# Patient Record
Sex: MALE | Race: WHITE | URBAN - METROPOLITAN AREA
[De-identification: names, ages, dates, MRNs, and addresses within clinical notes are randomized per-mention and may not be internally consistent; named-entity substitution may affect disease eponyms.]

---

## 2017-04-20 ENCOUNTER — OFFICE VISIT (OUTPATIENT)
Dept: DERMATOLOGY | Facility: AMBULATORY SURGERY CENTER | Age: 72
End: 2017-04-20

## 2017-04-20 VITALS
HEIGHT: 69 IN | DIASTOLIC BLOOD PRESSURE: 80 MMHG | HEART RATE: 64 BPM | SYSTOLIC BLOOD PRESSURE: 120 MMHG | TEMPERATURE: 97.9 F | OXYGEN SATURATION: 96 % | BODY MASS INDEX: 30.66 KG/M2 | WEIGHT: 207 LBS

## 2017-04-20 DIAGNOSIS — D18.01 CHERRY ANGIOMA: ICD-10-CM

## 2017-04-20 DIAGNOSIS — Z87.2 HISTORY OF ACTINIC KERATOSES: ICD-10-CM

## 2017-04-20 DIAGNOSIS — L90.5 SCAR CONDITION AND FIBROSIS OF SKIN: ICD-10-CM

## 2017-04-20 DIAGNOSIS — L82.1 SEBORRHEIC KERATOSES: ICD-10-CM

## 2017-04-20 DIAGNOSIS — Z85.828 HISTORY OF NONMELANOMA SKIN CANCER: ICD-10-CM

## 2017-04-20 DIAGNOSIS — D22.9 MULTIPLE BENIGN NEVI: Primary | ICD-10-CM

## 2017-04-20 RX ORDER — LANSOPRAZOLE 30 MG/1
CAPSULE, DELAYED RELEASE ORAL
COMMUNITY
Start: 2017-04-18

## 2017-04-20 RX ORDER — BENZONATATE 200 MG/1
CAPSULE ORAL
COMMUNITY
Start: 2017-04-09

## 2017-04-20 NOTE — PROGRESS NOTES
Name: Milagros Dupree       Age: 81 y.o. Date: 4/20/2017    Chief Complaint:   Chief Complaint   Patient presents with    Skin Exam     6 months       Subjective:    HPI  Mr. Milagros Dupree is a 81 y.o. male who presents for a full skin exam.  The patient's last skin exam was 6 months ago. At that visit I diagnosed a pigmented BCC on the right shoulder which was narrowly excised. The patient does not have current complaints related to his skin. Mr. Milagros Dupree is feeling well and in his usual state of health today. The patient's pertinent skin history includes : BCC of the right anterior shoulder, AK    ROS: Constitutional: Fatigue  Dermatological : Negative for skin lesion changes. He is not aware of any concerns on the right shoulder. CV: intermittent chest pain when taking Allegra    Social History     Social History    Marital status:      Spouse name: N/A    Number of children: N/A    Years of education: N/A     Occupational History    Not on file. Social History Main Topics    Smoking status: Never Smoker    Smokeless tobacco: Not on file    Alcohol use Yes    Drug use: No    Sexual activity: Not on file     Other Topics Concern    Not on file     Social History Narrative       No family history on file. Past Medical History:   Diagnosis Date    Skin cancer     Skin disorder     Sun-damaged skin     Sunburn, blistering        Past Surgical History:   Procedure Laterality Date    HX ORTHOPAEDIC      right knee       Current Outpatient Prescriptions   Medication Sig Dispense Refill    lansoprazole (PREVACID) 30 mg capsule       amLODIPine-benazepril (LOTREL) 5-10 mg per capsule Take 1 capsule by mouth daily.  benzonatate (TESSALON) 200 mg capsule       multivitamin (ONE A DAY) tablet Take 1 Tab by mouth daily.  omeprazole (PRILOSEC) 40 mg capsule Take 40 mg by mouth daily.          No Known Allergies      Objective:    Visit Vitals    /80 (BP 1 Location: Left arm, BP Patient Position: Sitting)    Pulse 64    Temp 97.9 °F (36.6 °C) (Oral)    Ht 5' 9\" (1.753 m)    Wt 93.9 kg (207 lb)    SpO2 96%    BMI 30.57 kg/m2       Jose Lea is a 81 y.o. male who appears well and in no distress. He is awake, alert, and oriented. There is no preauricular, submandibular, or cervical lymphadenopathy. A skin examination was performed including his scalp, face (including eyelids), ears, neck, chest, back, abdomen, upper extremities (including digits/nails), lower extremities, breasts, buttocks; genital skin was not examined. He is tanned skin on sun exposed areas. There are scattered waxy macules and keratotic papules consistent with seborrheic keratoses. He is scattered cherry angiomas. There are pink and brown intradermal as well as junctional nevi without concerning features. There is a well-healed scar on the mid back without concerning features for lesion recurrence. On the right anterior shoulder is also a pink scar without evidence of lesion recurrence, specifically no evidence of basal cell carcinoma. No new skin cancers or actinic keratosis are identified on today's exam.        Assessment/Plan:  1. Normal nevi. The diagnosis of normal nevi was reviewed. I discussed sun protection, sunscreen use, the warning signs of skin cancer, the need for self-skin examinations, and the need for regular practitioner exams every 6 months. The patient should follow up sooner as needed if new, changing, or symptomatic skin lesions arise. 2. Seborrheic keratoses. The diagnosis was reviewed and the patient was reassured that no treatment is needed for these benign lesions. 3. Cherry angiomas. The diagnosis was reviewed and the patient was reassured that no treatment is needed for these benign lesions. 4. Personal history of skin cancer.   I discussed sun protection, sunscreen use, the warning signs of skin cancer, the need for self-skin examinations, and the need for regular practitioner exams every 6 months. The patient should follow up sooner as needed if new, changing, or symptomatic skin lesions arise. Will continue to monitor right shoulder. Encouraged him to follow up with PCP if any further CP occurs.

## 2017-10-26 ENCOUNTER — OFFICE VISIT (OUTPATIENT)
Dept: DERMATOLOGY | Facility: AMBULATORY SURGERY CENTER | Age: 72
End: 2017-10-26

## 2017-10-26 VITALS
HEART RATE: 63 BPM | WEIGHT: 211 LBS | BODY MASS INDEX: 31.25 KG/M2 | OXYGEN SATURATION: 98 % | HEIGHT: 69 IN | TEMPERATURE: 97.7 F | RESPIRATION RATE: 18 BRPM | SYSTOLIC BLOOD PRESSURE: 140 MMHG | DIASTOLIC BLOOD PRESSURE: 86 MMHG

## 2017-10-26 DIAGNOSIS — L57.0 ACTINIC KERATOSIS: Primary | ICD-10-CM

## 2017-10-26 DIAGNOSIS — L81.4 LENTIGINES: ICD-10-CM

## 2017-10-26 DIAGNOSIS — L82.1 SEBORRHEIC KERATOSES: ICD-10-CM

## 2017-10-26 DIAGNOSIS — L90.5 SCAR CONDITION AND FIBROSIS OF SKIN: ICD-10-CM

## 2017-10-26 DIAGNOSIS — Z85.828 HISTORY OF NONMELANOMA SKIN CANCER: ICD-10-CM

## 2017-10-26 DIAGNOSIS — Z08 FOLLOW-UP SURVEILLANCE OF SKIN CANCER, ENCOUNTER FOR: ICD-10-CM

## 2017-10-26 DIAGNOSIS — D18.01 CHERRY ANGIOMA: ICD-10-CM

## 2017-10-26 DIAGNOSIS — D22.9 MULTIPLE BENIGN NEVI: ICD-10-CM

## 2017-10-26 DIAGNOSIS — Z85.828 FOLLOW-UP SURVEILLANCE OF SKIN CANCER, ENCOUNTER FOR: ICD-10-CM

## 2017-10-26 NOTE — PROGRESS NOTES
Chief Complaint   Patient presents with    Skin Exam     1. Have you been to the ER, urgent care clinic since your last visit? Hospitalized since your last visit? No    2. Have you seen or consulted any other health care providers outside of the [address] [place] [address] since your last visit? Include any pap smears or colon screening. Yes, PCP/ Dr. Candy Biswas 6 month check up.

## 2017-10-26 NOTE — MR AVS SNAPSHOT
Visit Information Date & Time Provider Department Dept. Phone Encounter #  
 10/26/2017  2:00 PM ANURAG Martinez [phone] Your Appointments 10/26/2017  2:00 PM  
Office Visit with ANURAG Martinez [phone] [facility]) Appt Note: est pt: 1 yr skin exam  
 [facility] [address]  
[facility] [place] [address] [address] [facility] [address] Upcoming Health Maintenance Date Due Hepatitis C Screening 1945 DTaP/Tdap/Td series (1 - Tdap) 3/24/1968 FOBT Q 1 YEAR AGE 50-75 3/24/1997 ZOSTER VACCINE AGE 60> 1/24/2007 GLAUCOMA SCREENING Q2Y 3/24/2012 Pneumococcal 65+ Low/Medium Risk (1 of 2 - PCV13) 3/24/2012 MEDICARE YEARLY EXAM 3/24/2012 Allergies as of 10/26/2017  Review Complete On: 10/26/2017 By: Allison Diaz No Known Allergies Current Immunizations  Never Reviewed No immunizations on file. Not reviewed this visit Vitals BP Pulse Temp Resp Height(growth percentile) Weight(growth percentile) 140/86 (BP 1 Location: Right arm, BP Patient Position: Sitting) 63 97.7 °F (36.5 °C) (Oral) 18 5' 9\" (1.753 m) 211 lb (95.7 kg) SpO2 BMI Smoking Status 98% 31.16 kg/m2 Never Smoker BMI and BSA Data Body Mass Index Body Surface Area  
 31.16 kg/m 2 2.16 m 2 Preferred Pharmacy Pharmacy Name Phone [facility] Keri Mendiolaly (E), [address] [facility] [phone] Your Updated Medication List  
  
   
This list is accurate as of: 10/26/17  1:50 PM.  Always use your most recent med list.  
  
  
  
  
 benzonatate 200 mg capsule Commonly known as:  TESSALON  
  
 lansoprazole 30 mg capsule Commonly known as:  PREVACID  
  
 LOTREL 5-10 mg per capsule Generic drug:  amLODIPine-benazepril Take 1 capsule by mouth daily. multivitamin tablet Commonly known as:  ONE A DAY Take 1 Tab by mouth daily. omeprazole 40 mg capsule Commonly known as:  PRILOSEC Take 40 mg by mouth daily. Patient Instructions Self Skin Exam and Sunscreens Early detection and treatment is essential in the treatment of all forms of skin cancer. If caught early, all forms of skin cancer are curable. In addition to your regular visits, you should perform a monthly skin examination. Over time, you become familiar with what is normally found on your skin and can identify new or suspicious spots. One of the screening tools you can use to assess your skin is to follow the ABCDEs: 
 
A= Asymmetry (One half is unlike the other half) B= Border (An irregular, scalloped or poorly defined edge) C= Color (Is varied from one area to another, has shades of tan, brown/ black,       white, red or blue) D= Diameter (Spots larger than 6mm or a pencil eraser) E= Evolving (New spots or one that is changing in size, shape, or color) A follow- up interval will be customized based on your history of skin cancer or level of skin damage and risk factors. In any case, if you notice a suspicious or new spot, an appointment should be arranged between regular visits. Everyone should use sunscreen and sun-safe practices, which is especially important for those with a personal or family history of skin cancer. Suggestions for this include: 1. Use daily moisturizers containing SPF 30 or higher. 2. Wear long sleeve clothing with UPF ratings and a broad-brimmed hat. 3. Apply sunscreen with SPF 30 or higher to all sun exposed areas if you are going to be in the sun. A broad spectrum UVA/ UVB sunscreen is best.  Dont forget to REAPPLY every two hours or more often if swimming or sweating! 4. Avoid outside activities during peak sun hours, especially in the summer (10am- 2pm). 5. DO NOT use tanning beds. Using sunscreen and sun-safe practices can help reduce the likelihood of developing skin cancer or additional skin cancers in those previously diagnosed. Introducing [facility] & HEALTH SERVICES! [facility] introduces Efficiency Exchange patient portal. Now you can access parts of your medical record, email your doctor's office, and request medication refills online. 1. In your internet browser, go to [email or web address] 2. Click on the First Time User? Click Here link in the Sign In box. You will see the New Member Sign Up page. 3. Enter your Efficiency Exchange Access Code exactly as it appears below. You will not need to use this code after youve completed the sign-up process. If you do not sign up before the expiration date, you must request a new code. · Efficiency Exchange Access Code: [email or web address] Expires: 1/24/2018  1:39 PM 
 
4. Enter the last four digits of your Social Security Number (xxxx) and Date of Birth (mm/dd/yyyy) as indicated and click Submit. You will be taken to the next sign-up page. 5. Create a Efficiency Exchange ID. This will be your Efficiency Exchange login ID and cannot be changed, so think of one that is secure and easy to remember. 6. Create a Efficiency Exchange password. You can change your password at any time. 7. Enter your Password Reset Question and Answer. This can be used at a later time if you forget your password. 8. Enter your e-mail address. You will receive e-mail notification when new information is available in [address] 9. Click Sign Up. You can now view and download portions of your medical record. 10. Click the Download Summary menu link to download a portable copy of your medical information. If you have questions, please visit the Frequently Asked Questions section of the Efficiency Exchange website. Remember, Efficiency Exchange is NOT to be used for urgent needs. For medical emergencies, dial 911. Now available from your iPhone and Android! Please provide this summary of care documentation to your next provider. Your primary care clinician is listed as Quinn Adhikari. If you have any questions after today's visit, please call [phone].

## 2017-10-26 NOTE — PROGRESS NOTES
Name: Kiara Monroe       Age: 81 y.o. Date: 10/26/2017    Chief Complaint:   Chief Complaint   Patient presents with    Skin Exam       Subjective:    HPI  Mr. Kiara Monroe is a 81 y.o. male who presents for a full skin exam.  The patient's last skin exam was 6 months ago and the patient does have current complaints related to his skin. He reports improvement in the scaly lesions on the scalp. The patient's pertinent skin history includes : AK, BCC right anterior shoulder    ROS: Constitutional: Negative. Dermatological : positive for - skin lesion changes      Social History     Social History    Marital status:      Spouse name: N/A    Number of children: N/A    Years of education: N/A     Occupational History    Not on file. Social History Main Topics    Smoking status: Never Smoker    Smokeless tobacco: Not on file    Alcohol use Yes    Drug use: No    Sexual activity: Not on file     Other Topics Concern    Not on file     Social History Narrative       No family history on file. Past Medical History:   Diagnosis Date    Skin cancer     Skin disorder     Sun-damaged skin     Sunburn, blistering        Past Surgical History:   Procedure Laterality Date    HX ORTHOPAEDIC      right knee       Current Outpatient Prescriptions   Medication Sig Dispense Refill    lansoprazole (PREVACID) 30 mg capsule       amLODIPine-benazepril (LOTREL) 5-10 mg per capsule Take 1 capsule by mouth daily.  benzonatate (TESSALON) 200 mg capsule       multivitamin (ONE A DAY) tablet Take 1 Tab by mouth daily.  omeprazole (PRILOSEC) 40 mg capsule Take 40 mg by mouth daily.          No Known Allergies      Objective:    Visit Vitals    /86 (BP 1 Location: Right arm, BP Patient Position: Sitting)    Pulse 63    Temp 97.7 °F (36.5 °C) (Oral)    Resp 18    Ht 5' 9\" (1.753 m)    Wt 95.7 kg (211 lb)    SpO2 98%    BMI 31.16 kg/m2       Kiara Monroe is a 81 y.o. male who appears well and in no distress. He is awake, alert, and oriented. There is no preauricular, submandibular, or cervical lymphadenopathy. A skin examination was performed including his scalp, face (including eyelids), ears, neck, chest, back, abdomen, upper extremities (including digits/nails), lower extremities (mid thighs to ankles), breasts. He has a thin scaled AK on the vertex scalp. There are scattered stuck on waxy macules and keratotic papules consistent with SKs. There are cherry angiomas. He has medium brown junctional and intradermal nevi - no concerning features. I did image on the mid back to the left of midline, 3 dots of pigment. No concern for recurrence on the right shoulder. He has lentigines as well. Assessment/Plan:  1. Normal nevi. The diagnosis of normal nevi was reviewed. I discussed sun protection, sunscreen use, the warning signs of skin cancer, the need for self-skin examinations, and the need for regular practitioner exams every 1 year. The patient should follow up sooner as needed if new, changing, or symptomatic skin lesions arise. 2.Seborrheic keratoses. The diagnosis was reviewed and the patient was reassured that no treatment is needed for these benign lesions. 3.Cherry angiomas. The diagnosis was reviewed and the patient was reassured that no treatment is needed for these benign lesions. 4.Personal history of skin cancer. I discussed sun protection, sunscreen use, the warning signs of skin cancer, the need for self-skin examinations, and the need for regular practitioner exams every 1 year. The patient should follow up sooner as needed if new, changing, or symptomatic skin lesions arise. 5.Actinic Keratoses. The diagnosis of this precancerous lesion related to sun exposure was reviewed. Verbal consent was obtained. I treated 1 lesions with cryotherapy and post-cryotherapy care was reviewed. 6. Solar lentigos.   The diagnosis and relationship to sun exposure was reviewed. Sun protection advised. [facility]   OFFICE PROCEDURE PROGRESS NOTE   Chart reviewed for the following:   Harjit MAHONEY, have reviewed the History, Physical and updated the Allergic reactions for Kathya Reddy. TIME OUT performed immediately prior to start of procedure:   Juana MAHONEY, have performed the following reviews on Kathya Reddy   prior to the start of the procedure:     * Patient was identified by name and date of birth   * Agreement on procedure being performed was verified   * Risks and Benefits explained to the patient   * Procedure site verified and marked as necessary   * Patient was positioned for comfort   * Verbal consent was obtained    Time: 1400  Date of procedure: 10/26/2017  Procedure performed by: Jay Jay Jama.  Demario Manley DNP  Provider assisted by: none   Patient assisted by: self   How tolerated by patient: tolerated the procedure well with no complications   Comments: none

## 2018-03-12 ENCOUNTER — OFFICE VISIT (OUTPATIENT)
Dept: DERMATOLOGY | Facility: AMBULATORY SURGERY CENTER | Age: 73
End: 2018-03-12

## 2018-03-12 VITALS
RESPIRATION RATE: 16 BRPM | WEIGHT: 211 LBS | OXYGEN SATURATION: 97 % | BODY MASS INDEX: 31.25 KG/M2 | DIASTOLIC BLOOD PRESSURE: 80 MMHG | TEMPERATURE: 97.9 F | HEART RATE: 60 BPM | SYSTOLIC BLOOD PRESSURE: 126 MMHG | HEIGHT: 69 IN

## 2018-03-12 DIAGNOSIS — L30.0 NUMMULAR DERMATITIS: Primary | ICD-10-CM

## 2018-03-12 RX ORDER — TRIAMCINOLONE ACETONIDE 1 MG/G
CREAM TOPICAL 2 TIMES DAILY
Qty: 30 G | Refills: 0 | Status: SHIPPED | OUTPATIENT
Start: 2018-03-12

## 2018-03-12 NOTE — PROGRESS NOTES
Name: Luís Castano       Age: 81 y.o. Date: 3/12/2018    Chief Complaint:   Chief Complaint   Patient presents with    Skin Exam     Chest       Subjective:    HPI:  Mr.. Luís Castano is a 81 y.o. male who presents for the evaluation of a lesion on the left chest.  He states that the lesion appeared 1 months ago. The patient has not had prior treatment for this lesion. Associated symptoms include stinging and itching. Tried Neosporin without relief. ROS: Consitutional: Negative  Dermatological : positive for - rash      Social History     Social History    Marital status:      Spouse name: N/A    Number of children: N/A    Years of education: N/A     Occupational History    Not on file. Social History Main Topics    Smoking status: Never Smoker    Smokeless tobacco: Never Used    Alcohol use Yes    Drug use: No    Sexual activity: Not on file     Other Topics Concern    Not on file     Social History Narrative       History reviewed. No pertinent family history. Past Medical History:   Diagnosis Date    Skin cancer     Skin disorder     Sun-damaged skin     Sunburn, blistering        Past Surgical History:   Procedure Laterality Date    HX ORTHOPAEDIC      right knee       Current Outpatient Prescriptions   Medication Sig Dispense Refill    triamcinolone acetonide (KENALOG) 0.1 % topical cream Apply  to affected area two (2) times a day. use thin layer 30 g 0    benzonatate (TESSALON) 200 mg capsule       lansoprazole (PREVACID) 30 mg capsule       multivitamin (ONE A DAY) tablet Take 1 Tab by mouth daily.  omeprazole (PRILOSEC) 40 mg capsule Take 40 mg by mouth daily.  amLODIPine-benazepril (LOTREL) 5-10 mg per capsule Take 1 capsule by mouth daily.          No Known Allergies      Objective:    Visit Vitals    /80 (BP 1 Location: Left arm, BP Patient Position: Sitting)    Pulse 60    Temp 97.9 °F (36.6 °C) (Oral)    Resp 16    Ht 5' 9\" (1.753 m)    Wt 95.7 kg (211 lb)    SpO2 97%    BMI 31.16 kg/m2       Kizzy Wang is a 81 y.o. male who appears well and in no distress. He is awake, alert, and oriented. A limited skin examination was completed including the left chest. There is a 3 cm patch of pink slightly scaly skin with one area of central clearance. Scraping for fungal component with KOH check- negative. Assessment/Plan:  1. Nummular dermatitis. Discussed diagnosis and I prescribed Triamcinolone for his use. Use and s/e discussed. He should call if not resolved in 2 weeks or worse.

## 2018-03-12 NOTE — MR AVS SNAPSHOT
[address] [facility] [address] 
[phone] Patient: Ginger Dupont MRN: [id number] PWL:[id number] Visit Information Date & Time Provider Department Dept. Phone Encounter #  
 3/12/2018  2:30 PM ANURAG Domínguez [id number] [phone] Your Appointments 3/12/2018  2:30 PM  
Office Visit with AUNRAG Domínguez 8057 [place]) Appt Note: est.pt concern about spot on his chest; est.pt concern about spot on his chest  
 [facility] [address] [phone]  
  
   
 [address] [address]  
  
    
 11/1/2018  2:00 PM  
Office Visit with ANURAG Domínguez 8057 [place] [address]) Appt Note: 1 yr skin exam  
 [address]  
[phone] Upcoming Health Maintenance Date Due Hepatitis C Screening 1945 DTaP/Tdap/Td series (1 - Tdap) 3/24/1968 FOBT Q 1 YEAR AGE 50-75 3/24/1997 ZOSTER VACCINE AGE 60> 1/24/2007 GLAUCOMA SCREENING Q2Y 3/24/2012 Bone Densitometry (Dexa) Screening 3/24/2012 Pneumococcal 65+ Low/Medium Risk (1 of 2 - PCV13) 3/24/2012 MEDICARE YEARLY EXAM 3/24/2012 Allergies as of 3/12/2018  Review Complete On: 3/12/2018 By: Sidney Liu LPN No Known Allergies Current Immunizations  Never Reviewed No immunizations on file. Not reviewed this visit Vitals BP Pulse Temp Resp Height(growth percentile) Weight(growth percentile) 126/80 (BP 1 Location: Left arm, BP Patient Position: Sitting) 60 97.9 °F (36.6 °C) (Oral) 16 5' 9\" (1.753 m) 211 lb (95.7 kg) SpO2 BMI Smoking Status 97% 31.16 kg/m2 Never Smoker BMI and BSA Data  Body Mass Index Body Surface Area  
 31.16 kg/m 2 2.16 m 2  
  
 Preferred Pharmacy Pharmacy Name Phone Carin [facility] [address]), [address] [phone] Your Updated Medication List  
  
   
This list is accurate as of 3/12/18  2:16 PM.  Always use your most recent med list.  
  
  
  
  
 benzonatate 200 mg capsule Commonly known as:  TESSALON  
  
 lansoprazole 30 mg capsule Commonly known as:  PREVACID  
  
 LOTREL 5-10 mg per capsule Generic drug:  amLODIPine-benazepril Take 1 capsule by mouth daily. multivitamin tablet Commonly known as:  ONE A DAY Take 1 Tab by mouth daily. omeprazole 40 mg capsule Commonly known as:  PRILOSEC Take 40 mg by mouth daily. Patient Instructions Self Skin Exam and Sunscreens Early detection and treatment is essential in the treatment of all forms of skin cancer. If caught early, all forms of skin cancer are curable. In addition to your regular visits, you should perform a monthly skin examination. Over time, you become familiar with what is normally found on your skin and can identify new or suspicious spots. One of the screening tools you can use to assess your skin is to follow the ABCDEs: 
 
A= Asymmetry (One half is unlike the other half) B= Border (An irregular, scalloped or poorly defined edge) C= Color (Is varied from one area to another, has shades of tan, brown/ black,       white, red or blue) D= Diameter (Spots larger than 6mm or a pencil eraser) E= Evolving (New spots or one that is changing in size, shape, or color) A follow- up interval will be customized based on your history of skin cancer or level of skin damage and risk factors. In any case, if you notice a suspicious or new spot, an appointment should be arranged between regular visits. Everyone should use sunscreen and sun-safe practices, which is especially important for those with a personal or family history of skin cancer. Suggestions for this include: 1. Use daily moisturizers containing SPF 30 or higher. 2. Wear long sleeve clothing with UPF ratings and a broad-brimmed hat. 3. Apply sunscreen with SPF 30 or higher to all sun exposed areas if you are going to be in the sun. A broad spectrum UVA/ UVB sunscreen is best.  Dont forget to REAPPLY every two hours or more often if swimming or sweating! 4. Avoid outside activities during peak sun hours, especially in the summer (10am- 2pm). 5. DO NOT use tanning beds. Using sunscreen and sun-safe practices can help reduce the likelihood of developing skin cancer or additional skin cancers in those previously diagnosed. Introducing [facility] & HEALTH SERVICES! [facility] introduces [employer]zy patient portal. Now you can access parts of your medical record, email your doctor's office, and request medication refills online. 1. In your internet browser, go to [email or web address] 2. Click on the First Time User? Click Here link in the Sign In box. You will see the New Member Sign Up page. 3. Enter your Fanzy Access Code exactly as it appears below. You will not need to use this code after youve completed the sign-up process. If you do not sign up before the expiration date, you must request a new code. · Fanzy Access Code: [email or web address] Expires: 6/10/2018  2:14 PM 
 
4. Enter the last four digits of your Social Security Number (xxxx) and Date of Birth (mm/dd/yyyy) as indicated and click Submit. You will be taken to the next sign-up page. 5. Create a Fanzy ID. This will be your Fanzy login ID and cannot be changed, so think of one that is secure and easy to remember. 6. Create a Fanzy password. You can change your password at any time. 7. Enter your Password Reset Question and Answer. This can be used at a later time if you forget your password. 8. Enter your e-mail address.  You will receive e-mail notification when new information is available in [employer]. 9. Click Sign Up. You can now view and download portions of your medical record. 10. Click the Download Summary menu link to download a portable copy of your medical information. If you have questions, please visit the Frequently Asked Questions section of the nuMVC website. Remember, nuMVC is NOT to be used for urgent needs. For medical emergencies, dial 911. Now available from your iPhone and Android! Please provide this summary of care documentation to your next provider. Your primary care clinician is listed as Patricia Shea. If you have any questions after today's visit, please call [phone].

## 2022-02-04 ENCOUNTER — PREPPED CHART (OUTPATIENT)
Dept: URBAN - METROPOLITAN AREA CLINIC 98 | Facility: CLINIC | Age: 77
End: 2022-02-04

## 2022-02-04 PROBLEM — H43.811 POSTERIOR VITREOUS DETACHMENT: Noted: 2022-02-04

## 2022-02-04 PROBLEM — H25.13 NS CATARACT: Noted: 2022-02-04

## 2022-02-04 PROBLEM — H43.393 FLOATERS: Noted: 2022-02-04

## 2022-02-04 PROBLEM — H35.361 MACULAR DRUSEN: Noted: 2022-02-04

## 2022-10-14 ASSESSMENT — VISUAL ACUITY
OS_PH: 20/25-2
OD_SC: 20/40-1
OS_SC: 20/40
OD_PH: 20/30-2

## 2022-10-14 ASSESSMENT — TONOMETRY
OD_IOP_MMHG: 18
OS_IOP_MMHG: 17

## 2023-02-07 ENCOUNTER — FOLLOW UP (OUTPATIENT)
Dept: URBAN - METROPOLITAN AREA CLINIC 98 | Facility: CLINIC | Age: 78
End: 2023-02-07

## 2023-02-07 DIAGNOSIS — H43.393: ICD-10-CM

## 2023-02-07 DIAGNOSIS — H43.811: ICD-10-CM

## 2023-02-07 DIAGNOSIS — H35.361: ICD-10-CM

## 2023-02-07 PROCEDURE — 92014 COMPRE OPH EXAM EST PT 1/>: CPT

## 2023-02-07 PROCEDURE — 92202 OPSCPY EXTND ON/MAC DRAW: CPT

## 2023-02-07 PROCEDURE — 92134 CPTRZ OPH DX IMG PST SGM RTA: CPT

## 2023-02-07 ASSESSMENT — TONOMETRY
OD_IOP_MMHG: 14
OS_IOP_MMHG: 17

## 2023-02-07 ASSESSMENT — VISUAL ACUITY
OD_PH: 20/30-2
OS_SC: 20/60
OD_SC: 20/40-2

## 2024-02-13 ENCOUNTER — ESTABLISHED COMPREHENSIVE EXAM (OUTPATIENT)
Dept: URBAN - METROPOLITAN AREA CLINIC 98 | Facility: CLINIC | Age: 79
End: 2024-02-13

## 2024-02-13 DIAGNOSIS — H35.411: ICD-10-CM

## 2024-02-13 DIAGNOSIS — H26.493: ICD-10-CM

## 2024-02-13 DIAGNOSIS — H35.361: ICD-10-CM

## 2024-02-13 DIAGNOSIS — H43.393: ICD-10-CM

## 2024-02-13 DIAGNOSIS — H43.811: ICD-10-CM

## 2024-02-13 PROCEDURE — 92014 COMPRE OPH EXAM EST PT 1/>: CPT

## 2024-02-13 PROCEDURE — 92134 CPTRZ OPH DX IMG PST SGM RTA: CPT

## 2024-02-13 PROCEDURE — 92201 OPSCPY EXTND RTA DRAW UNI/BI: CPT

## 2024-02-13 ASSESSMENT — TONOMETRY
OD_IOP_MMHG: 14
OS_IOP_MMHG: 15

## 2024-02-13 ASSESSMENT — VISUAL ACUITY
OD_SC: 20/20-1
OS_SC: 20/20-1